# Patient Record
Sex: FEMALE | Race: WHITE | NOT HISPANIC OR LATINO | URBAN - METROPOLITAN AREA
[De-identification: names, ages, dates, MRNs, and addresses within clinical notes are randomized per-mention and may not be internally consistent; named-entity substitution may affect disease eponyms.]

---

## 2023-03-22 ENCOUNTER — INPATIENT (INPATIENT)
Facility: HOSPITAL | Age: 78
LOS: 0 days | Discharge: ANOTHER HOSPITAL | DRG: 190 | End: 2023-03-23
Attending: INTERNAL MEDICINE | Admitting: INTERNAL MEDICINE
Payer: MEDICARE

## 2023-03-22 VITALS
SYSTOLIC BLOOD PRESSURE: 95 MMHG | HEIGHT: 68 IN | OXYGEN SATURATION: 96 % | HEART RATE: 114 BPM | DIASTOLIC BLOOD PRESSURE: 62 MMHG | RESPIRATION RATE: 20 BRPM | TEMPERATURE: 98 F | WEIGHT: 139.99 LBS

## 2023-03-22 DIAGNOSIS — J44.1 CHRONIC OBSTRUCTIVE PULMONARY DISEASE WITH (ACUTE) EXACERBATION: ICD-10-CM

## 2023-03-22 DIAGNOSIS — I21.4 NON-ST ELEVATION (NSTEMI) MYOCARDIAL INFARCTION: ICD-10-CM

## 2023-03-22 DIAGNOSIS — Z96.649 PRESENCE OF UNSPECIFIED ARTIFICIAL HIP JOINT: Chronic | ICD-10-CM

## 2023-03-22 DIAGNOSIS — G62.9 POLYNEUROPATHY, UNSPECIFIED: ICD-10-CM

## 2023-03-22 DIAGNOSIS — F41.9 ANXIETY DISORDER, UNSPECIFIED: ICD-10-CM

## 2023-03-22 DIAGNOSIS — Z96.642 PRESENCE OF LEFT ARTIFICIAL HIP JOINT: Chronic | ICD-10-CM

## 2023-03-22 DIAGNOSIS — N28.9 DISORDER OF KIDNEY AND URETER, UNSPECIFIED: ICD-10-CM

## 2023-03-22 DIAGNOSIS — R06.2 WHEEZING: ICD-10-CM

## 2023-03-22 LAB
ALBUMIN SERPL ELPH-MCNC: 4.4 G/DL — SIGNIFICANT CHANGE UP (ref 3.3–5)
ALP SERPL-CCNC: 73 U/L — SIGNIFICANT CHANGE UP (ref 40–120)
ALT FLD-CCNC: 18 U/L — SIGNIFICANT CHANGE UP (ref 10–45)
ANION GAP SERPL CALC-SCNC: 13 MMOL/L — SIGNIFICANT CHANGE UP (ref 5–17)
APTT BLD: 27.6 SEC — SIGNIFICANT CHANGE UP (ref 27.5–35.5)
APTT BLD: 29.5 SEC — SIGNIFICANT CHANGE UP (ref 27.5–35.5)
AST SERPL-CCNC: 23 U/L — SIGNIFICANT CHANGE UP (ref 10–40)
BASE EXCESS BLDV CALC-SCNC: -0.6 MMOL/L — SIGNIFICANT CHANGE UP (ref -2–3)
BASE EXCESS BLDV CALC-SCNC: -1.9 MMOL/L — SIGNIFICANT CHANGE UP (ref -2–3)
BASOPHILS # BLD AUTO: 0.04 K/UL — SIGNIFICANT CHANGE UP (ref 0–0.2)
BASOPHILS NFR BLD AUTO: 0.4 % — SIGNIFICANT CHANGE UP (ref 0–2)
BILIRUB SERPL-MCNC: 0.8 MG/DL — SIGNIFICANT CHANGE UP (ref 0.2–1.2)
BLOOD GAS VENOUS - CREATININE: SIGNIFICANT CHANGE UP MG/DL (ref 0.5–1.3)
BUN SERPL-MCNC: 15 MG/DL — SIGNIFICANT CHANGE UP (ref 7–23)
CA-I SERPL-SCNC: 1.2 MMOL/L — SIGNIFICANT CHANGE UP (ref 1.15–1.33)
CALCIUM SERPL-MCNC: 9 MG/DL — SIGNIFICANT CHANGE UP (ref 8.4–10.5)
CHLORIDE BLDV-SCNC: 107 MMOL/L — SIGNIFICANT CHANGE UP (ref 96–108)
CHLORIDE SERPL-SCNC: 106 MMOL/L — SIGNIFICANT CHANGE UP (ref 96–108)
CO2 BLDV-SCNC: 23 MMOL/L — SIGNIFICANT CHANGE UP (ref 22–26)
CO2 BLDV-SCNC: 28 MMOL/L — HIGH (ref 22–26)
CO2 SERPL-SCNC: 22 MMOL/L — SIGNIFICANT CHANGE UP (ref 22–31)
CREAT SERPL-MCNC: 0.59 MG/DL — SIGNIFICANT CHANGE UP (ref 0.5–1.3)
EGFR: 93 ML/MIN/1.73M2 — SIGNIFICANT CHANGE UP
EOSINOPHIL # BLD AUTO: 0.18 K/UL — SIGNIFICANT CHANGE UP (ref 0–0.5)
EOSINOPHIL NFR BLD AUTO: 1.8 % — SIGNIFICANT CHANGE UP (ref 0–6)
GAS PNL BLDV: 137 MMOL/L — SIGNIFICANT CHANGE UP (ref 136–145)
GAS PNL BLDV: SIGNIFICANT CHANGE UP
GLUCOSE BLDV-MCNC: 93 MG/DL — SIGNIFICANT CHANGE UP (ref 70–99)
GLUCOSE SERPL-MCNC: 99 MG/DL — SIGNIFICANT CHANGE UP (ref 70–99)
HCO3 BLDV-SCNC: 22 MMOL/L — SIGNIFICANT CHANGE UP (ref 22–29)
HCO3 BLDV-SCNC: 27 MMOL/L — SIGNIFICANT CHANGE UP (ref 22–29)
HCT VFR BLD CALC: 47 % — HIGH (ref 34.5–45)
HCT VFR BLDA CALC: 47 % — HIGH (ref 34.5–46.5)
HGB BLD CALC-MCNC: 15.8 G/DL — SIGNIFICANT CHANGE UP (ref 11.7–16.1)
HGB BLD-MCNC: 15.1 G/DL — SIGNIFICANT CHANGE UP (ref 11.5–15.5)
IMM GRANULOCYTES NFR BLD AUTO: 0.4 % — SIGNIFICANT CHANGE UP (ref 0–0.9)
INR BLD: 1.03 RATIO — SIGNIFICANT CHANGE UP (ref 0.88–1.16)
INR BLD: 1.04 RATIO — SIGNIFICANT CHANGE UP (ref 0.88–1.16)
LACTATE BLDV-MCNC: 1.6 MMOL/L — SIGNIFICANT CHANGE UP (ref 0.5–2)
LYMPHOCYTES # BLD AUTO: 0.53 K/UL — LOW (ref 1–3.3)
LYMPHOCYTES # BLD AUTO: 5.4 % — LOW (ref 13–44)
MCHC RBC-ENTMCNC: 30.3 PG — SIGNIFICANT CHANGE UP (ref 27–34)
MCHC RBC-ENTMCNC: 32.1 GM/DL — SIGNIFICANT CHANGE UP (ref 32–36)
MCV RBC AUTO: 94.4 FL — SIGNIFICANT CHANGE UP (ref 80–100)
MONOCYTES # BLD AUTO: 0.62 K/UL — SIGNIFICANT CHANGE UP (ref 0–0.9)
MONOCYTES NFR BLD AUTO: 6.3 % — SIGNIFICANT CHANGE UP (ref 2–14)
NEUTROPHILS # BLD AUTO: 8.41 K/UL — HIGH (ref 1.8–7.4)
NEUTROPHILS NFR BLD AUTO: 85.7 % — HIGH (ref 43–77)
NRBC # BLD: 0 /100 WBCS — SIGNIFICANT CHANGE UP (ref 0–0)
PCO2 BLDV: 35 MMHG — LOW (ref 39–42)
PCO2 BLDV: 53 MMHG — HIGH (ref 39–42)
PH BLDV: 7.31 — LOW (ref 7.32–7.43)
PH BLDV: 7.41 — SIGNIFICANT CHANGE UP (ref 7.32–7.43)
PLATELET # BLD AUTO: 168 K/UL — SIGNIFICANT CHANGE UP (ref 150–400)
PO2 BLDV: 42 MMHG — SIGNIFICANT CHANGE UP (ref 25–45)
PO2 BLDV: 72 MMHG — HIGH (ref 25–45)
POTASSIUM BLDV-SCNC: 4.4 MMOL/L — SIGNIFICANT CHANGE UP (ref 3.5–5.1)
POTASSIUM SERPL-MCNC: 4.7 MMOL/L — SIGNIFICANT CHANGE UP (ref 3.5–5.3)
POTASSIUM SERPL-SCNC: 4.7 MMOL/L — SIGNIFICANT CHANGE UP (ref 3.5–5.3)
PROT SERPL-MCNC: 6.6 G/DL — SIGNIFICANT CHANGE UP (ref 6–8.3)
PROTHROM AB SERPL-ACNC: 11.8 SEC — SIGNIFICANT CHANGE UP (ref 10.5–13.4)
PROTHROM AB SERPL-ACNC: 12 SEC — SIGNIFICANT CHANGE UP (ref 10.5–13.4)
RAPID RVP RESULT: SIGNIFICANT CHANGE UP
RBC # BLD: 4.98 M/UL — SIGNIFICANT CHANGE UP (ref 3.8–5.2)
RBC # FLD: 13.2 % — SIGNIFICANT CHANGE UP (ref 10.3–14.5)
SAO2 % BLDV: 73.2 % — SIGNIFICANT CHANGE UP (ref 67–88)
SAO2 % BLDV: 97.2 % — HIGH (ref 67–88)
SARS-COV-2 RNA SPEC QL NAA+PROBE: SIGNIFICANT CHANGE UP
SODIUM SERPL-SCNC: 141 MMOL/L — SIGNIFICANT CHANGE UP (ref 135–145)
TROPONIN T, HIGH SENSITIVITY RESULT: 388 NG/L — HIGH (ref 0–51)
TROPONIN T, HIGH SENSITIVITY RESULT: 466 NG/L — HIGH (ref 0–51)
TROPONIN T, HIGH SENSITIVITY RESULT: 564 NG/L — HIGH (ref 0–51)
WBC # BLD: 9.82 K/UL — SIGNIFICANT CHANGE UP (ref 3.8–10.5)
WBC # FLD AUTO: 9.82 K/UL — SIGNIFICANT CHANGE UP (ref 3.8–10.5)

## 2023-03-22 PROCEDURE — 99285 EMERGENCY DEPT VISIT HI MDM: CPT | Mod: FS

## 2023-03-22 PROCEDURE — 99223 1ST HOSP IP/OBS HIGH 75: CPT

## 2023-03-22 PROCEDURE — 71045 X-RAY EXAM CHEST 1 VIEW: CPT | Mod: 26

## 2023-03-22 PROCEDURE — 70450 CT HEAD/BRAIN W/O DYE: CPT | Mod: 26,MA

## 2023-03-22 PROCEDURE — 71275 CT ANGIOGRAPHY CHEST: CPT | Mod: 26,MA

## 2023-03-22 RX ORDER — SODIUM CHLORIDE 9 MG/ML
500 INJECTION INTRAMUSCULAR; INTRAVENOUS; SUBCUTANEOUS ONCE
Refills: 0 | Status: COMPLETED | OUTPATIENT
Start: 2023-03-22 | End: 2023-03-22

## 2023-03-22 RX ORDER — EPINEPHRINE 0.3 MG/.3ML
0.5 INJECTION INTRAMUSCULAR; SUBCUTANEOUS ONCE
Refills: 0 | Status: COMPLETED | OUTPATIENT
Start: 2023-03-22 | End: 2023-03-22

## 2023-03-22 RX ORDER — IPRATROPIUM/ALBUTEROL SULFATE 18-103MCG
3 AEROSOL WITH ADAPTER (GRAM) INHALATION EVERY 6 HOURS
Refills: 0 | Status: DISCONTINUED | OUTPATIENT
Start: 2023-03-22 | End: 2023-03-23

## 2023-03-22 RX ORDER — IPRATROPIUM/ALBUTEROL SULFATE 18-103MCG
3 AEROSOL WITH ADAPTER (GRAM) INHALATION ONCE
Refills: 0 | Status: COMPLETED | OUTPATIENT
Start: 2023-03-22 | End: 2023-03-22

## 2023-03-22 RX ORDER — ASPIRIN/CALCIUM CARB/MAGNESIUM 324 MG
324 TABLET ORAL ONCE
Refills: 0 | Status: COMPLETED | OUTPATIENT
Start: 2023-03-22 | End: 2023-03-22

## 2023-03-22 RX ORDER — ACETAMINOPHEN 500 MG
650 TABLET ORAL EVERY 6 HOURS
Refills: 0 | Status: DISCONTINUED | OUTPATIENT
Start: 2023-03-22 | End: 2023-03-23

## 2023-03-22 RX ORDER — LANOLIN ALCOHOL/MO/W.PET/CERES
3 CREAM (GRAM) TOPICAL AT BEDTIME
Refills: 0 | Status: DISCONTINUED | OUTPATIENT
Start: 2023-03-22 | End: 2023-03-23

## 2023-03-22 RX ORDER — ASPIRIN/CALCIUM CARB/MAGNESIUM 324 MG
81 TABLET ORAL DAILY
Refills: 0 | Status: DISCONTINUED | OUTPATIENT
Start: 2023-03-22 | End: 2023-03-23

## 2023-03-22 RX ORDER — ESCITALOPRAM OXALATE 10 MG/1
10 TABLET, FILM COATED ORAL DAILY
Refills: 0 | Status: DISCONTINUED | OUTPATIENT
Start: 2023-03-22 | End: 2023-03-23

## 2023-03-22 RX ORDER — TIOTROPIUM BROMIDE 18 UG/1
2 CAPSULE ORAL; RESPIRATORY (INHALATION) DAILY
Refills: 0 | Status: DISCONTINUED | OUTPATIENT
Start: 2023-03-22 | End: 2023-03-23

## 2023-03-22 RX ORDER — HEPARIN SODIUM 5000 [USP'U]/ML
3800 INJECTION INTRAVENOUS; SUBCUTANEOUS ONCE
Refills: 0 | Status: COMPLETED | OUTPATIENT
Start: 2023-03-22 | End: 2023-03-23

## 2023-03-22 RX ORDER — HEPARIN SODIUM 5000 [USP'U]/ML
INJECTION INTRAVENOUS; SUBCUTANEOUS
Qty: 25000 | Refills: 0 | Status: DISCONTINUED | OUTPATIENT
Start: 2023-03-22 | End: 2023-03-23

## 2023-03-22 RX ORDER — MAGNESIUM SULFATE 500 MG/ML
2 VIAL (ML) INJECTION ONCE
Refills: 0 | Status: COMPLETED | OUTPATIENT
Start: 2023-03-22 | End: 2023-03-22

## 2023-03-22 RX ORDER — BUDESONIDE, MICRONIZED 100 %
0.5 POWDER (GRAM) MISCELLANEOUS EVERY 12 HOURS
Refills: 0 | Status: DISCONTINUED | OUTPATIENT
Start: 2023-03-22 | End: 2023-03-23

## 2023-03-22 RX ORDER — BUDESONIDE AND FORMOTEROL FUMARATE DIHYDRATE 160; 4.5 UG/1; UG/1
2 AEROSOL RESPIRATORY (INHALATION)
Refills: 0 | Status: DISCONTINUED | OUTPATIENT
Start: 2023-03-22 | End: 2023-03-22

## 2023-03-22 RX ORDER — ONDANSETRON 8 MG/1
4 TABLET, FILM COATED ORAL EVERY 8 HOURS
Refills: 0 | Status: DISCONTINUED | OUTPATIENT
Start: 2023-03-22 | End: 2023-03-23

## 2023-03-22 RX ORDER — MONTELUKAST 4 MG/1
10 TABLET, CHEWABLE ORAL DAILY
Refills: 0 | Status: DISCONTINUED | OUTPATIENT
Start: 2023-03-22 | End: 2023-03-23

## 2023-03-22 RX ORDER — MAGNESIUM SULFATE 500 MG/ML
2 VIAL (ML) INJECTION ONCE
Refills: 0 | Status: DISCONTINUED | OUTPATIENT
Start: 2023-03-22 | End: 2023-03-22

## 2023-03-22 RX ORDER — ATORVASTATIN CALCIUM 80 MG/1
40 TABLET, FILM COATED ORAL AT BEDTIME
Refills: 0 | Status: DISCONTINUED | OUTPATIENT
Start: 2023-03-22 | End: 2023-03-23

## 2023-03-22 RX ORDER — LORATADINE 10 MG/1
10 TABLET ORAL DAILY
Refills: 0 | Status: DISCONTINUED | OUTPATIENT
Start: 2023-03-22 | End: 2023-03-23

## 2023-03-22 RX ORDER — HEPARIN SODIUM 5000 [USP'U]/ML
3800 INJECTION INTRAVENOUS; SUBCUTANEOUS EVERY 6 HOURS
Refills: 0 | Status: DISCONTINUED | OUTPATIENT
Start: 2023-03-22 | End: 2023-03-23

## 2023-03-22 RX ORDER — GABAPENTIN 400 MG/1
300 CAPSULE ORAL AT BEDTIME
Refills: 0 | Status: DISCONTINUED | OUTPATIENT
Start: 2023-03-22 | End: 2023-03-23

## 2023-03-22 RX ORDER — ACETAMINOPHEN 500 MG
650 TABLET ORAL ONCE
Refills: 0 | Status: COMPLETED | OUTPATIENT
Start: 2023-03-22 | End: 2023-03-22

## 2023-03-22 RX ADMIN — Medication 3 MILLILITER(S): at 11:11

## 2023-03-22 RX ADMIN — Medication 2 GRAM(S): at 11:45

## 2023-03-22 RX ADMIN — SODIUM CHLORIDE 500 MILLILITER(S): 9 INJECTION INTRAMUSCULAR; INTRAVENOUS; SUBCUTANEOUS at 20:08

## 2023-03-22 RX ADMIN — GABAPENTIN 300 MILLIGRAM(S): 400 CAPSULE ORAL at 22:19

## 2023-03-22 RX ADMIN — Medication 150 GRAM(S): at 11:21

## 2023-03-22 RX ADMIN — Medication 3 MILLILITER(S): at 11:18

## 2023-03-22 RX ADMIN — SODIUM CHLORIDE 500 MILLILITER(S): 9 INJECTION INTRAMUSCULAR; INTRAVENOUS; SUBCUTANEOUS at 19:08

## 2023-03-22 RX ADMIN — EPINEPHRINE 0.5 MILLIGRAM(S): 0.3 INJECTION INTRAMUSCULAR; SUBCUTANEOUS at 14:08

## 2023-03-22 RX ADMIN — Medication 3 MILLILITER(S): at 11:27

## 2023-03-22 RX ADMIN — Medication 3 MILLILITER(S): at 14:07

## 2023-03-22 RX ADMIN — ATORVASTATIN CALCIUM 40 MILLIGRAM(S): 80 TABLET, FILM COATED ORAL at 22:19

## 2023-03-22 RX ADMIN — Medication 650 MILLIGRAM(S): at 20:13

## 2023-03-22 RX ADMIN — Medication 125 MILLIGRAM(S): at 11:11

## 2023-03-22 RX ADMIN — Medication 650 MILLIGRAM(S): at 17:35

## 2023-03-22 NOTE — ED PROVIDER NOTE - ATTENDING APP SHARED VISIT CONTRIBUTION OF CARE
I, Michelle Delaney, performed a history and physical exam of the patient and discussed their management with the advanced care provider. I reviewed the note and agree with the documented findings and plan of care. I was present and available for all procedures.    ---    Please see MDM

## 2023-03-22 NOTE — ED PROVIDER NOTE - NS ED ROS FT
Constitutional: No fever or chills  Eyes: No visual changes, eye pain or redness  HEENT: No throat pain, ear pain, nasal pain. No nose bleeding.  CV: +chest pain -lower extremity edema  Resp: + SOB +cough  GI: No abd pain. No nausea or vomiting. No diarrhea. No constipation.   : No dysuria, hematuria.   MSK: No musculoskeletal pain  Skin: No rash  Neuro: +headache. No numbness or tingling. No weakness.

## 2023-03-22 NOTE — ED PROVIDER NOTE - PROGRESS NOTE DETAILS
Chuck PGY2 - Received sign-out on patient. Introduced myself and updated patient on the medical evaluation process. Patient aware and in agreement. ENT came by to evaluate patient, patient cleared for po intake. Chuck, PGY2 - spoke with Dr. Ward, accepting patient for cardiac ischemia causing respiratory distress

## 2023-03-22 NOTE — ED ADULT NURSE REASSESSMENT NOTE - NS ED NURSE REASSESS COMMENT FT1
Family member becoming agitated regarding wait time. Family and patient reeducated about wait time and was updated about time frame for cat scan. Patient's needs assessed with provider, reassessed patient 3 times since beginning of shift at 1430. Spoke with provider about reeducating family on POC. No acute distress present at this time. Call bell within reach, bed in lowest position.

## 2023-03-22 NOTE — ED PROVIDER NOTE - CARE PLAN
1 Principal Discharge DX:	COPD with respiratory distress, acute  Secondary Diagnosis:	Cardiac ischemia

## 2023-03-22 NOTE — ED PROVIDER NOTE - CLINICAL SUMMARY MEDICAL DECISION MAKING FREE TEXT BOX
Michelle Delaney MD (Attending Physician): 77F with PMHx COPD, asthma presents for acute onset SOB with wheezing this morning. Pt reports left wrist s/p fall last night. No LOC, not on AC and did not hit head. Took meds at home with no relief. Received 2 duoneb PTA. On exam, tachypneic, No airway movement with expiratory wheezes diffusely. CVS: tachycardic. Differentials include but are not limited to: pneumothorax, COPD exacerbation, pneumonia, asthma exacerbation, pleural effusion, ACS, viral illness. Will administer duoneb,steroids, mag. Will obtain labs, CXR and reassess. Michelle Delaney MD (Attending Physician): 77F with PMHx COPD, asthma presents for acute onset SOB with wheezing this morning. Pt reports left wrist s/p fall last night. No LOC, not on AC and did not hit head. Took meds at home with no relief. Received 2 duoneb PTA. On exam, tachypneic, No airway movement with expiratory wheezes diffusely. CVS: tachycardic. Differentials include but are not limited to: pneumothorax, COPD exacerbation, pneumonia, asthma exacerbation, pleural effusion, ACS, viral illness. Will administer duoneb,steroids, mag. Will obtain labs, CXR and reassess.    Wheezes have resolved however still cannot hear great air movement. troponin elevated in the 300s,r patient ecg unremarkable. discussed CT Angio for further eval - r/o PE. Pt full code per daughter. Attempted epinephrine with no success. Pt still mentating well, O2 sat % on NC. Cards consulted and patient to be admitted for further management/optimization of COPD exacerbation.

## 2023-03-22 NOTE — H&P ADULT - NSHPLABSRESULTS_GEN_ALL_CORE
Labs personally reviewed:                          15.1   9.82  )-----------( 168      ( 22 Mar 2023 11:05 )             47.0     03-22    141  |  106  |  15  ----------------------------<  99  4.7   |  22  |  0.59    Ca    9.0      22 Mar 2023 11:05    TPro  6.6  /  Alb  4.4  /  TBili  0.8  /  DBili  x   /  AST  23  /  ALT  18  /  AlkPhos  73  03-22    CARDIAC MARKERS ( 22 Mar 2023 14:24 )  x     / x     / 89 U/L / x     / 10.4 ng/mL      LIVER FUNCTIONS - ( 22 Mar 2023 11:05 )  Alb: 4.4 g/dL / Pro: 6.6 g/dL / ALK PHOS: 73 U/L / ALT: 18 U/L / AST: 23 U/L / GGT: x           PT/INR - ( 22 Mar 2023 15:26 )   PT: 11.8 sec;   INR: 1.03 ratio         PTT - ( 22 Mar 2023 15:26 )  PTT:27.6 sec    CAPILLARY BLOOD GLUCOSE          Imaging:  CXR personally reviewed: no focal opacity  CTA chest: No pulmonary embolism.    1.3 cm ill-defined groundglass nodular opacity within the right upper   lobe is nonspecific. Differential diagnostic considerations include small   focus of infection/inflammation, focus of scarring or primary lung   neoplasm. 3 month follow-up noncontrast chest CT is recommended for   further evaluation.    Emphysema.    8mm indeterminate exophytic nodule arising from the partially imaged left   kidney. A 3 month follow-up contrast-enhanced liver MRI can be performed   for further evaluation.    CTH : No acute intracranial bleeding, mass effect, or shift. If the patient has   new and persistent symptoms, consider short interval follow-up head CT or   brain MRI follow-up.      EKG personally reviewed:  sinus tachycardia at 106 bpm , q wave v2 ,  twave flat avl, no NEYMAR or depression

## 2023-03-22 NOTE — ED ADULT NURSE REASSESSMENT NOTE - NS ED NURSE REASSESS COMMENT FT1
Report taken from [facility]. Patient sitting up in bed. Family is present at bedside. Patient and family updated on POC. Patient given wet gauze to wet mouth for comfort. No acute distress present at this time. Call bell within reach, bed in lowest position.

## 2023-03-22 NOTE — H&P ADULT - NSHPPHYSICALEXAM_GEN_ALL_CORE
Vital Signs Last 24 Hrs  T(C): 36.7 (22 Mar 2023 18:44), Max: 36.9 (22 Mar 2023 10:38)  T(F): 98 (22 Mar 2023 18:44), Max: 98.5 (22 Mar 2023 14:00)  HR: 116 (22 Mar 2023 18:44) (101 - 120)  BP: 114/55 (22 Mar 2023 18:44) (94/66 - 114/55)  BP(mean): 71 (22 Mar 2023 18:44) (65 - 71)  RR: 20 (22 Mar 2023 18:44) (20 - 22)  SpO2: 99% (22 Mar 2023 18:44) (96% - 99%)    Parameters below as of 22 Mar 2023 18:44  Patient On (Oxygen Delivery Method): nasal cannula  O2 Flow (L/min): 3 Vital Signs Last 24 Hrs  T(C): 36.7 (22 Mar 2023 18:44), Max: 36.9 (22 Mar 2023 10:38)  T(F): 98 (22 Mar 2023 18:44), Max: 98.5 (22 Mar 2023 14:00)  HR: 116 (22 Mar 2023 18:44) (101 - 120)  BP: 114/55 (22 Mar 2023 18:44) (94/66 - 114/55)  BP(mean): 71 (22 Mar 2023 18:44) (65 - 71)  RR: 20 (22 Mar 2023 18:44) (20 - 22)  SpO2: 99% (22 Mar 2023 18:44) (96% - 99%)    Parameters below as of 22 Mar 2023 18:44  Patient On (Oxygen Delivery Method): nasal cannula    GENERAL: moderate respiratory  distress, well-developed, able to complete full sentences no retractions or nasal flaring   HEAD:  Atraumatic, Normocephalic  ENT: EOMI, PERRLA, conjunctiva and sclera clear,  moist mucosa no pharyngeal erythema or exudates   NECK: supple , no JVD   CHEST/LUNG: decreased air movement bilaterally; No audible  wheeze, equal breath sounds bilaterally   BACK: No spinal tenderness,  No CVA tenderness   HEART: Regular rate and rhythm; No murmurs, rubs, or gallops  ABDOMEN: Soft, Nontender, Nondistended; Bowel sounds present  EXTREMITIES:  No clubbing, cyanosis, trace  edema on LLE   MSK: No joint swelling or effusions, ROM intact   PSYCH: Normal behavior/affect  NEUROLOGY: AAOx3, non-focal, cranial nerves intact  SKIN: Normal color, No rashes or lesions  O2 Flow (L/min): 3

## 2023-03-22 NOTE — H&P ADULT - PROBLEM SELECTOR PLAN 5
8mm indeterminate exophytic nodule arising from the partially imaged left   kidney.  findings on CT disclosed to daughter , recommend 3 month follow up

## 2023-03-22 NOTE — H&P ADULT - NSICDXFAMILYHX_GEN_ALL_CORE_FT
FAMILY HISTORY:  Father  Still living? Unknown  FH: myocardial infarction, Age at diagnosis: Age Unknown    Mother  Still living? Unknown  FH: rheumatic heart disease, Age at diagnosis: Age Unknown

## 2023-03-22 NOTE — H&P ADULT - PROBLEM SELECTOR PLAN 1
up trending cardiac enzymes , ekg w/ no acute s-t changes , patient c/o active chest pain s/p ASA load , will start heparin for ACS protocol; discussed with cardiology, recent cath w/ clean coronaries makes CAD less likely , mores likely type II NStemi in setting of COPD exacerbation   - Telemetry   - trend troponin until peaks   - Heparin infusion ACS protocol   - continue asa   - a1c/ lipid profile   - atorvastatin  - echocardiogram   - follow up cardiology consult

## 2023-03-22 NOTE — ED PROVIDER NOTE - OBJECTIVE STATEMENT
77yof pmhx of COPD BIB EMS for wheezing and sob. daughter at bedside, reports pt sustained a fall last night ?landed on her knees or L side and then this am with cough, wheezing, miguel and sob. reports taking neb and rescue inhaler but didn't help. No recent URI symx. No fever or chills. no leg pain or swelling. No sick contacts. last admission for COPD 2016

## 2023-03-22 NOTE — ED ADULT NURSE REASSESSMENT NOTE - NS ED NURSE REASSESS COMMENT FT1
Patient reports Tylenol per PA order provided relief of headache. Patient asked about bathroom use. Nurse educated patient on use of call bell and capability to bring patient with assist to bathroom with wheelchair. Patient on continuos cardiac monitoring. No acute distress present at this time. Call bell within reach, bed in lowest position.

## 2023-03-22 NOTE — CONSULT NOTE ADULT - SUBJECTIVE AND OBJECTIVE BOX
CC: Upper Airway Evaluation.    HPI: 76 YO F with PMH of COPD presents to [facility] ED for wheezing and and SOB. ENT was consulted for evaluation. Per daughter at bedside, pt noted to have cough, wheezing, and SOB this morning. Reports taking Neb and rescue Inhaler but didnt help. Pt denies hoarseness, muffled voice, drooling, able to handle secretions without difficulty.     PAST MEDICAL & SURGICAL HISTORY:    Allergies  penicillin (Unknown)    Intolerances  MEDICATIONS  (STANDING):  MEDICATIONS  (PRN):    Social History:   Family history:     ROS:   ENT: all negative except as noted in HPI   CV: denies palpitations  Pulm: denies SOB, cough, hemoptysis  GI: denies change in apetite, indigestion, n/v  : denies pertinent urinary symptoms, urgency  Neuro: denies numbness/tingling, loss of sensation  Psych: denies anxiety  MS: denies muscle weakness, instability  Heme: denies easy bruising or bleeding  Endo: denies heat/cold intolerance, excessive sweating  Vascular: denies LE edema    Vital Signs Last 24 Hrs  T(C): 36.7 (22 Mar 2023 18:44), Max: 36.9 (22 Mar 2023 10:38)  T(F): 98 (22 Mar 2023 18:44), Max: 98.5 (22 Mar 2023 14:00)  HR: 116 (22 Mar 2023 18:44) (101 - 120)  BP: 114/55 (22 Mar 2023 18:44) (94/66 - 114/55)  BP(mean): 71 (22 Mar 2023 18:44) (65 - 71)  RR: 20 (22 Mar 2023 18:44) (20 - 22)  SpO2: 99% (22 Mar 2023 18:44) (96% - 99%)  Parameters below as of 22 Mar 2023 18:44  Patient On (Oxygen Delivery Method): nasal cannula  O2 Flow (L/min): 3                        15.1   9.82  )-----------( 168      ( 22 Mar 2023 11:05 )             47.0    03-22    141  |  106  |  15  ----------------------------<  99  4.7   |  22  |  0.59    Ca    9.0      22 Mar 2023 11:05  TPro  6.6  /  Alb  4.4  /  TBili  0.8  /  DBili  x   /  AST  23  /  ALT  18  /  AlkPhos  73  03-22   PT/INR - ( 22 Mar 2023 15:26 )   PT: 11.8 sec;   INR: 1.03 ratio   PTT - ( 22 Mar 2023 15:26 )  PTT:27.6 sec    PHYSICAL EXAM:  Gen: NAD, On 2L NC.   Skin: No rashes, bruises, or lesions  Head: Normocephalic, Atraumatic  Face: no edema, erythema, or fluctuance. Parotid glands soft without mass  Eyes: no scleral injection  Nose: Nares bilaterally patent, no discharge  Mouth: No Stridor / Drooling / Trismus.  Mucosa moist, tongue/uvula midline, oropharynx clear  Neck: Flat, supple, no lymphadenopathy, trachea midline, no masses  Lymphatic: No lymphadenopathy  Resp: breathing easily, no stridor  CV: no peripheral edema/cyanosis  GI: nondistended   Peripheral vascular: no JVD or edema  Neuro: facial nerve intact, no facial droop    Fiberoptic Indirect laryngoscopy:  (Scope #2 used)  Reason for Laryngoscopy: Upper Airway Evaluation.     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  Vocal cords mobile with good contact b/l.    Nasopharynx, oropharynx, and hypopharynx clear, no bleeding. Tongue base, posterior pharyngeal wall, vallecula, epiglottis, and subglottis appear normal. No erythema, edema, pooling of secretions, masses or lesions. Airway patent, no foreign body visualized. No glottic/supraglottic edema. True vocal cords, arytenoids, vestibular folds, ventricles, pyriform sinuses, and aryepiglottic folds appear normal bilaterally.    IMAGING/ADDITIONAL STUDIES:   CT Head: IMPRESSION: No acute intracranial bleeding, mass effect, or shift. If the patient has   new and persistent symptoms, consider short interval follow-up head CT or   brain MRI follow-up.    CTA of Chest: IMPRESSION:  No pulmonary embolism.    1.3 cm ill-defined groundglass nodular opacity within the right upper   lobe is nonspecific. Differential diagnostic considerations include small   focus of infection/inflammation, focus of scarring or primary lung   neoplasm. 3 month follow-up noncontrast chest CT is recommended for   further evaluation.    Emphysema.    8mm indeterminate exophytic nodule arising from the partially imaged left   kidney. A 3 month follow-up contrast-enhanced liver MRI can be performed   for further evaluation.  CC: Upper Airway Evaluation.    HPI: 76 YO F with PMH of COPD, Asthma  presents to [facility] ED for wheezing and and SOB x1 day. . ENT was consulted for evaluation. Per daughter at bedside, pt noted to have cough, wheezing, and SOB this morning. Reports taking Neb and rescue Inhaler but didnt help. Pt denies throat closing sensation, Foreign body sensation,  hoarseness, muffled voice, drooling, able to handle secretions without difficulty. Also reports  a fall from bed sustaining trauma to her knees , no head trauma no reported LOC  last night. Also denies fever/chills, runny nose, sinus pain, sore throat, N/V, CP, HA, Dysphagia/Odynophagia, recent travel/sick contacts.     PAST MEDICAL & SURGICAL HISTORY:    Allergies  penicillin (Unknown)    Intolerances  MEDICATIONS  (STANDING):  MEDICATIONS  (PRN):    Social History:   Family history:     ROS:   ENT: all negative except as noted in HPI   CV: denies palpitations  Pulm: denies SOB, cough, hemoptysis  GI: denies change in apetite, indigestion, n/v  : denies pertinent urinary symptoms, urgency  Neuro: denies numbness/tingling, loss of sensation  Psych: denies anxiety  MS: denies muscle weakness, instability  Heme: denies easy bruising or bleeding  Endo: denies heat/cold intolerance, excessive sweating  Vascular: denies LE edema    Vital Signs Last 24 Hrs  T(C): 36.7 (22 Mar 2023 18:44), Max: 36.9 (22 Mar 2023 10:38)  T(F): 98 (22 Mar 2023 18:44), Max: 98.5 (22 Mar 2023 14:00)  HR: 116 (22 Mar 2023 18:44) (101 - 120)  BP: 114/55 (22 Mar 2023 18:44) (94/66 - 114/55)  BP(mean): 71 (22 Mar 2023 18:44) (65 - 71)  RR: 20 (22 Mar 2023 18:44) (20 - 22)  SpO2: 99% (22 Mar 2023 18:44) (96% - 99%)  Parameters below as of 22 Mar 2023 18:44  Patient On (Oxygen Delivery Method): nasal cannula  O2 Flow (L/min): 3                        15.1   9.82  )-----------( 168      ( 22 Mar 2023 11:05 )             47.0    03-22    141  |  106  |  15  ----------------------------<  99  4.7   |  22  |  0.59    Ca    9.0      22 Mar 2023 11:05  TPro  6.6  /  Alb  4.4  /  TBili  0.8  /  DBili  x   /  AST  23  /  ALT  18  /  AlkPhos  73  03-22   PT/INR - ( 22 Mar 2023 15:26 )   PT: 11.8 sec;   INR: 1.03 ratio   PTT - ( 22 Mar 2023 15:26 )  PTT:27.6 sec    PHYSICAL EXAM:  Gen: NAD, On 2L NC.   Skin: No rashes, bruises, or lesions  Head: Normocephalic, Atraumatic  Face: no edema, erythema, or fluctuance. Parotid glands soft without mass  Eyes: no scleral injection  Nose: Nares bilaterally patent, no discharge  Mouth: No Stridor / Drooling / Trismus.  Mucosa moist, tongue/uvula midline, oropharynx clear  Neck: Flat, supple, no lymphadenopathy, trachea midline, no masses  Lymphatic: No lymphadenopathy  Resp: breathing easily, no stridor  CV: no peripheral edema/cyanosis  GI: nondistended   Peripheral vascular: no JVD or edema  Neuro: facial nerve intact, no facial droop    Fiberoptic Indirect laryngoscopy:  (Scope #2 used)  Reason for Laryngoscopy: Upper Airway Evaluation.     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  Vocal cords mobile with good contact b/l. Diffuse dryness in larynx.     Nasopharynx, oropharynx, and hypopharynx clear, no bleeding. Tongue base, posterior pharyngeal wall, vallecula, epiglottis, and subglottis appear normal. No erythema, edema, pooling of secretions, masses or lesions. Airway patent, no foreign body visualized. No glottic/supraglottic edema. True vocal cords, arytenoids, vestibular folds, ventricles, pyriform sinuses, and aryepiglottic folds appear normal bilaterally.    IMAGING/ADDITIONAL STUDIES:   CT Head: IMPRESSION: No acute intracranial bleeding, mass effect, or shift. If the patient has   new and persistent symptoms, consider short interval follow-up head CT or   brain MRI follow-up.    CTA of Chest: IMPRESSION:  No pulmonary embolism.    1.3 cm ill-defined groundglass nodular opacity within the right upper   lobe is nonspecific. Differential diagnostic considerations include small   focus of infection/inflammation, focus of scarring or primary lung   neoplasm. 3 month follow-up noncontrast chest CT is recommended for   further evaluation.    Emphysema.    8mm indeterminate exophytic nodule arising from the partially imaged left   kidney. A 3 month follow-up contrast-enhanced liver MRI can be performed   for further evaluation.

## 2023-03-22 NOTE — H&P ADULT - HISTORY OF PRESENT ILLNESS
Patient is a 77 year old female w/ pmhx of COPD ,  present for wheezing and shortness of breath for the past day.   Patient is a 77 year old female w/ pmhx of COPD ,  present for wheezing and shortness of breath for the past day.  Patient reports wheezing and shortness of breath with non productive cough , started on morning of admission , no improvement with albuterol or nebulizers treatment. She also reports right sided chest pain at the time , not worse with exertion or deep breath , but exacerbated with coughing , No fever or chills , no  sick contacts , no recent travel.   Per family , patient had a fall from bed sustaining trauma to her knees , no head trauma no reported LOC , no preceding symptoms.   Patient had cardiac cath at [facility] in May ’22 which reportedly showed no significant abnormalities.

## 2023-03-22 NOTE — ED ADULT NURSE NOTE - NSSEPSISSUSPECTED_ED_A_ED
Surgery Date: 3/28/2017      Surgeon(s) and Role:  * Vinicio Gray Jr., MD - Primary  * Karolina Treadwell MD - Resident - Assisting           Pre-op Diagnosis: DUB (dysfunctional uterine bleeding) [N93.8]     Post-op Diagnosis: Post-Op Diagnosis Codes:  * DUB (dysfunctional uterine bleeding) [N93.8]     Procedure(s) (LRB):  ROBOTIC ASSISTED LAPAROSCOPIC HYSTERECTOMY (N/A)  CYSTOSCOPY     Anesthesia: General     Findings/Key Components:   1) Uterus that sounded to 9.5cm  2) Normal appearing cervix without any masses or lesions  3) Omental adhesions to anterior abdominal wall, partially taken down  4) Liver with adhesions consistent with [id number]  5) Normal bowel throughout. No signs of injury  6) Normal appearing bilateral fallopian tubes and ovaries. Evidence of previous BTL.  7) Bilateral ureters noted without signs of injury  8) On cystoscopy, efflux of bilateral ureteral orifices noted.   9) Hemostasis throughout pelvis  10) On fluoroscopy, no evidence of any needles in abdomen. Needle found lodged in Left trocar. Removed intact     Estimated Blood Loss: 100 mL       Yes

## 2023-03-22 NOTE — H&P ADULT - NSHPREVIEWOFSYSTEMS_GEN_ALL_CORE
CONSTITUTIONAL: No weakness, fevers or chills  EYES/ENT: No visual changes;  No dysphagia  NECK: No pain or stiffness  RESPIRATORY: +  cough,  + wheezing, no hemoptysis; +  shortness of breath  CARDIOVASCULAR: +  chest pain no  palpitations; No lower extremity edema  EXTREMITIES: no le edema, cyanosis, clubbing  GASTROINTESTINAL: No abdominal or epigastric pain. + nausea, vomiting, or hematemesis; No diarrhea or constipation. No melena or hematochezia.  BACK: +  back pain  GENITOURINARY: No dysuria, frequency or hematuria  NEUROLOGICAL: No numbness or weakness  MSK: no joint swelling or pain  SKIN: No itching, burning, rashes, or lesions   PSYCH: no agitation  All other review of systems is negative unless indicated above.

## 2023-03-22 NOTE — ED ADULT NURSE NOTE - OBJECTIVE STATEMENT
1030 77 yr old WF brought to ER via ambulance on stretcher for further eval and tx of SOB and dyspnea x a few hours upon awakening this AM. Was given 2 Duo nebs by EMS enroute to ER. Also tripped and fell yesterday landing on knees. No LOC. A&Ox4. Color pink. Skin W&D. Decreased breath sounds. Scattered wheezes. O2 100% NRB changed to NC 3LPM. PMH COPD. Quit smoking 2017. Fall risk precautions maintained. Hx through pt's daughter. Dr hargrove pt. Cardiac monitor applied

## 2023-03-22 NOTE — CONSULT NOTE ADULT - SUBJECTIVE AND OBJECTIVE BOX
DATE OF SERVICE: 03-22-23 @ 23:37    CHIEF COMPLAINT:Patient is a 77y old  Female who presents with a chief complaint of Wheezing and SOB x 1 day (22 Mar 2023 20:51)      HISTORY OF PRESENT ILLNESS:HPI:  Patient is a 77 year old female w/ pmhx of COPD ,  present for wheezing and shortness of breath for the past day.  Patient reports wheezing and shortness of breath with non productive cough , started on morning of admission , no improvement with albuterol or nebulizers treatment. She also reports right sided chest pain at the time , not worse with exertion or deep breath , but exacerbated with coughing , No fever or chills , no  sick contacts , no recent travel.   Per family , patient had a fall from bed sustaining trauma to her knees , no head trauma no reported LOC , no preceding symptoms.   Patient had cardiac cath at [facility] in May ’22 which reportedly showed no significant abnormalities.      (22 Mar 2023 20:51)      PAST MEDICAL & SURGICAL HISTORY:  COPD exacerbation      H/O rheumatoid arthritis      S/P hip replacement              MEDICATIONS:  aspirin enteric coated 81 milliGRAM(s) Oral daily  heparin   Injectable 3800 Unit(s) IV Push once  heparin   Injectable 3800 Unit(s) IV Push every 6 hours PRN  heparin  Infusion.  Unit(s)/Hr IV Continuous <Continuous>      albuterol/ipratropium for Nebulization 3 milliLiter(s) Nebulizer every 6 hours  buDESOnide    Inhalation Suspension 0.5 milliGRAM(s) Inhalation every 12 hours  loratadine 10 milliGRAM(s) Oral daily  montelukast 10 milliGRAM(s) Oral daily  tiotropium 2.5 MICROgram(s) Inhaler 2 Puff(s) Inhalation daily    acetaminophen     Tablet .. 650 milliGRAM(s) Oral every 6 hours PRN  escitalopram 10 milliGRAM(s) Oral daily  gabapentin 300 milliGRAM(s) Oral at bedtime  melatonin 3 milliGRAM(s) Oral at bedtime PRN  ondansetron Injectable 4 milliGRAM(s) IV Push every 8 hours PRN    aluminum hydroxide/magnesium hydroxide/simethicone Suspension 30 milliLiter(s) Oral every 4 hours PRN    atorvastatin 40 milliGRAM(s) Oral at bedtime        FAMILY HISTORY:  FH: myocardial infarction (Father)    FH: rheumatic heart disease (Mother)        Non-contributory    SOCIAL HISTORY:    [ ] former smoker    Allergies    penicillin (Unknown)    Intolerances    	    REVIEW OF SYSTEMS:  CONSTITUTIONAL: No fever  EYES: No eye pain, visual disturbances, or discharge  ENMT:  No difficulty hearing, tinnitus  NECK: No pain or stiffness  RESPIRATORY: + cough, wheezing, SOB  CARDIOVASCULAR: No chest pain, palpitations, passing out, dizziness, or leg swelling  GASTROINTESTINAL:  No nausea, vomiting, diarrhea or constipation. No melena.  GENITOURINARY: No dysuria, hematuria  NEUROLOGICAL: No stroke like symptoms  SKIN: No burning or lesions   ENDOCRINE: No heat or cold intolerance  MUSCULOSKELETAL: No joint pain or swelling  PSYCHIATRIC: No  anxiety, mood swings  HEME/LYMPH: No bleeding gums  ALLERGY AND IMMUNOLOGIC: No hives or eczema	    All other ROS negative    PHYSICAL EXAM:  T(C): 37 (03-22-23 @ 21:40), Max: 37 (03-22-23 @ 21:40)  HR: 102 (03-22-23 @ 22:25) (100 - 120)  BP: 107/64 (03-22-23 @ 22:25) (94/66 - 114/55)  RR: 18 (03-22-23 @ 21:40) (18 - 22)  SpO2: 97% (03-22-23 @ 21:40) (96% - 99%)  Wt(kg): --  I&O's Summary      Appearance: Normal	  HEENT:   Normal oral mucosa, EOMI	  Cardiovascular:  S1 S2, No JVD,    Respiratory: Lungs clear to auscultation	  Psychiatry: Alert  Gastrointestinal:  Soft, Non-tender, + BS	  Skin: No rashes   Neurologic: Non-focal  Extremities:  No edema  Vascular: Peripheral pulses palpable    	    	  	  CARDIAC MARKERS:  Labs personally reviewed by me                                  15.1   9.82  )-----------( 168      ( 22 Mar 2023 11:05 )             47.0     03-22    141  |  106  |  15  ----------------------------<  99  4.7   |  22  |  0.59    Ca    9.0      22 Mar 2023 11:05    TPro  6.6  /  Alb  4.4  /  TBili  0.8  /  DBili  x   /  AST  23  /  ALT  18  /  AlkPhos  73  03-22          EKG: Personally reviewed by me - Sinus tach, no ischemic changes  Radiology: Personally reviewed by me -   < from: CT Angio Chest PE Protocol w/ IV Cont (03.22.23 @ 19:09) >  IMPRESSION:  No pulmonary embolism.    1.3 cm ill-defined groundglass nodular opacity within the right upper   lobe is nonspecific. Differential diagnostic considerations include small   focus of infection/inflammation, focus of scarring or primary lung   neoplasm. 3 month follow-up noncontrast chest CT is recommended for   further evaluation.    Emphysema.    8mm indeterminate exophytic nodule arising from the partially imaged left   kidney. A 3 month follow-up contrast-enhanced liver MRI can be performed   for further evaluation.    < end of copied text >          Labs, Radiology, Cardiology, and Other Results: Labs personally reviewed:                          15.1   9.82  )-----------( 168      ( 22 Mar 2023 11:05 )             47.0     03-22    141  |  106  |  15  ----------------------------<  99  4.7   |  22  |  0.59    Ca    9.0      22 Mar 2023 11:05    TPro  6.6  /  Alb  4.4  /  TBili  0.8  /  DBili  x   /  AST  23  /  ALT  18  /  AlkPhos  73  03-22    CARDIAC MARKERS ( 22 Mar 2023 14:24 )  x     / x     / 89 U/L / x     / 10.4 ng/mL      LIVER FUNCTIONS - ( 22 Mar 2023 11:05 )  Alb: 4.4 g/dL / Pro: 6.6 g/dL / ALK PHOS: 73 U/L / ALT: 18 U/L / AST: 23 U/L / GGT: x           PT/INR - ( 22 Mar 2023 15:26 )   PT: 11.8 sec;   INR: 1.03 ratio         PTT - ( 22 Mar 2023 15:26 )  PTT:27.6 sec    CAPILLARY BLOOD GLUCOSE          Imaging:  CXR personally reviewed: no focal opacity  CTA chest: No pulmonary embolism.    1.3 cm ill-defined groundglass nodular opacity within the right upper   lobe is nonspecific. Differential diagnostic considerations include small   focus of infection/inflammation, focus of scarring or primary lung   neoplasm. 3 month follow-up noncontrast chest CT is recommended for   further evaluation.    Emphysema.    8mm indeterminate exophytic nodule arising from the partially imaged left   kidney. A 3 month follow-up contrast-enhanced liver MRI can be performed   for further evaluation.    CTH : No acute intracranial bleeding, mass effect, or shift. If the patient has   new and persistent symptoms, consider short interval follow-up head CT or   brain MRI follow-up.      EKG personally reviewed:  sinus tachycardia at 106 bpm , q wave v2 ,  twave flat avl, no NEYMAR or depression    Assessment and Plan:   · VTE Risk Assessment	VTE Assessment already completed for this visit    Assessment:  · Assessment	  77Fw/ pmhx of COPD ,  p/w SOB and Chest pain x 1 day       Problem/Plan - 1:  ·  Problem: NSTEMI (non-ST elevation myocardial infarction).   ·  Plan: up trending cardiac enzymes , ekg w/ no acute s-t changes , patient DENIES any chest pain at any point,  s/p ASA load   - given rising trop will start Hep gtt for now  -  recent cath 5/9/2022 at [facility] reviewed w/ clean coronaries and only 25% RCA stenosis, this makes primary NSTEMI less likely  - more likely type II MI in setting of COPD exacerbation and Hypoxia  - Telemetry   - trend troponin until peaks   - continue asa   - a1c/ lipid profile   - atorvastatin  - echocardiogram     Problem/Plan - 2:  ·  Problem: COPD exacerbation.   ·  Plan: does not meet gold criteria for antibiotics   - duoneb q6h   - continue Solumedrol   - Budesonide   - tiotropium   - Singulair   - pulmonary consult Dr Atkinson called   - continue oxygen as needed.    Problem/Plan - 3:  ·  Problem: Anxiety.   ·  Plan: - Lexapro.    Problem/Plan - 4:  ·  Problem: Neuropathy.   ·  Plan: -gabapentin.    Problem/Plan - 5:  ·  Problem: Incidentals  ·  Plan: 8mm indeterminate exophytic nodule arising from the partially imaged left kidney. And Lung nodule   Findings on CT disclosed to daughter , recommend 3 month follow up.            Differential diagnosis and plan of care discussed with patient after the evaluation. Counseling on diet, nutritional counseling, weight management, exercise and medication compliance was done.   Advanced care planning/advanced directives discussed with patient/family. DNR status including forceful chest compressions to attempt to restart the heart, ventilator support/artificial breathing, electric shock, artificial nutrition, health care proxy, Molst form all discussed with pt. Pt wishes to consider. More than fifteen minutes spent on discussing advanced directives.            Sridhar Ward DO [facility]  Cardiovascular Medicine  [address] [place] [address], [address]  Office [phone]  Available via call/text on [employer] Teams

## 2023-03-22 NOTE — ED PROVIDER NOTE - PHYSICAL EXAMINATION
Gen: AAO x 3, talking in 3-4 word sentences with mild retractions.   Skin: No rashes or lesions  HEENT: NC/AT, PERRLA, EOMI, MMM  Resp: decrease air entry throughout, mild inspiratory and exp wheezing RUL   Cardiac: tachy s1s2, no murmurs, rubs or gallops  GI: ND, +BS, Soft, NT  Ext: no pedal edema, FROM in all extremities  Neuro: no focal deficits

## 2023-03-22 NOTE — H&P ADULT - PROBLEM SELECTOR PLAN 2
does not meet gold criteria for antibiotics   - duoneb q6h   - continue Solumedrol   - Budesonide   - tiotropium   - Singulair   - pulmonary consult - to be arranged by day team  - continue oxygen as needed

## 2023-03-22 NOTE — CONSULT NOTE ADULT - PROBLEM SELECTOR RECOMMENDATION 9
- FOE: Post cricoid edema, pachydermia 2/2 LPRD.  Vocal cords mobile with good contact b/l. Diffuse dryness in larynx.  - Humidified O2 via Face tent.   - NS 0.65% 2gtts TID to b/l nares.   - PPI.   - Duo Nebs prn.   - Call with questions.     # [phone]  ENT PA

## 2023-03-23 VITALS
HEART RATE: 101 BPM | OXYGEN SATURATION: 92 % | RESPIRATION RATE: 18 BRPM | SYSTOLIC BLOOD PRESSURE: 118 MMHG | DIASTOLIC BLOOD PRESSURE: 77 MMHG | TEMPERATURE: 98 F

## 2023-03-23 LAB
A1C WITH ESTIMATED AVERAGE GLUCOSE RESULT: 5.2 % — SIGNIFICANT CHANGE UP (ref 4–5.6)
ALBUMIN SERPL ELPH-MCNC: 4.2 G/DL — SIGNIFICANT CHANGE UP (ref 3.3–5)
ALP SERPL-CCNC: 70 U/L — SIGNIFICANT CHANGE UP (ref 40–120)
ALT FLD-CCNC: 16 U/L — SIGNIFICANT CHANGE UP (ref 10–45)
ANION GAP SERPL CALC-SCNC: 11 MMOL/L — SIGNIFICANT CHANGE UP (ref 5–17)
APTT BLD: 51.5 SEC — HIGH (ref 27.5–35.5)
AST SERPL-CCNC: 24 U/L — SIGNIFICANT CHANGE UP (ref 10–40)
BASOPHILS # BLD AUTO: 0.01 K/UL — SIGNIFICANT CHANGE UP (ref 0–0.2)
BASOPHILS NFR BLD AUTO: 0.1 % — SIGNIFICANT CHANGE UP (ref 0–2)
BILIRUB SERPL-MCNC: 0.9 MG/DL — SIGNIFICANT CHANGE UP (ref 0.2–1.2)
BUN SERPL-MCNC: 15 MG/DL — SIGNIFICANT CHANGE UP (ref 7–23)
CALCIUM SERPL-MCNC: 9.4 MG/DL — SIGNIFICANT CHANGE UP (ref 8.4–10.5)
CHLORIDE SERPL-SCNC: 105 MMOL/L — SIGNIFICANT CHANGE UP (ref 96–108)
CHOLEST SERPL-MCNC: 201 MG/DL — HIGH
CO2 SERPL-SCNC: 22 MMOL/L — SIGNIFICANT CHANGE UP (ref 22–31)
CREAT SERPL-MCNC: 0.62 MG/DL — SIGNIFICANT CHANGE UP (ref 0.5–1.3)
EGFR: 92 ML/MIN/1.73M2 — SIGNIFICANT CHANGE UP
EOSINOPHIL # BLD AUTO: 0 K/UL — SIGNIFICANT CHANGE UP (ref 0–0.5)
EOSINOPHIL NFR BLD AUTO: 0 % — SIGNIFICANT CHANGE UP (ref 0–6)
ESTIMATED AVERAGE GLUCOSE: 103 MG/DL — SIGNIFICANT CHANGE UP (ref 68–114)
GLUCOSE SERPL-MCNC: 100 MG/DL — HIGH (ref 70–99)
HCT VFR BLD CALC: 42.5 % — SIGNIFICANT CHANGE UP (ref 34.5–45)
HCT VFR BLD CALC: 43.1 % — SIGNIFICANT CHANGE UP (ref 34.5–45)
HCV AB S/CO SERPL IA: 1.02 S/CO — HIGH (ref 0–0.99)
HCV AB SERPL-IMP: ABNORMAL
HDLC SERPL-MCNC: 68 MG/DL — SIGNIFICANT CHANGE UP
HGB BLD-MCNC: 14.3 G/DL — SIGNIFICANT CHANGE UP (ref 11.5–15.5)
HGB BLD-MCNC: 14.4 G/DL — SIGNIFICANT CHANGE UP (ref 11.5–15.5)
IMM GRANULOCYTES NFR BLD AUTO: 0.7 % — SIGNIFICANT CHANGE UP (ref 0–0.9)
LIPID PNL WITH DIRECT LDL SERPL: 120 MG/DL — HIGH
LYMPHOCYTES # BLD AUTO: 0.49 K/UL — LOW (ref 1–3.3)
LYMPHOCYTES # BLD AUTO: 5 % — LOW (ref 13–44)
MCHC RBC-ENTMCNC: 30.9 PG — SIGNIFICANT CHANGE UP (ref 27–34)
MCHC RBC-ENTMCNC: 31.5 PG — SIGNIFICANT CHANGE UP (ref 27–34)
MCHC RBC-ENTMCNC: 33.2 GM/DL — SIGNIFICANT CHANGE UP (ref 32–36)
MCHC RBC-ENTMCNC: 33.9 GM/DL — SIGNIFICANT CHANGE UP (ref 32–36)
MCV RBC AUTO: 93 FL — SIGNIFICANT CHANGE UP (ref 80–100)
MCV RBC AUTO: 93.1 FL — SIGNIFICANT CHANGE UP (ref 80–100)
MONOCYTES # BLD AUTO: 0.49 K/UL — SIGNIFICANT CHANGE UP (ref 0–0.9)
MONOCYTES NFR BLD AUTO: 5 % — SIGNIFICANT CHANGE UP (ref 2–14)
NEUTROPHILS # BLD AUTO: 8.82 K/UL — HIGH (ref 1.8–7.4)
NEUTROPHILS NFR BLD AUTO: 89.2 % — HIGH (ref 43–77)
NON HDL CHOLESTEROL: 132 MG/DL — HIGH
NRBC # BLD: 0 /100 WBCS — SIGNIFICANT CHANGE UP (ref 0–0)
NRBC # BLD: 0 /100 WBCS — SIGNIFICANT CHANGE UP (ref 0–0)
PLATELET # BLD AUTO: 172 K/UL — SIGNIFICANT CHANGE UP (ref 150–400)
PLATELET # BLD AUTO: 179 K/UL — SIGNIFICANT CHANGE UP (ref 150–400)
POTASSIUM SERPL-MCNC: 4.4 MMOL/L — SIGNIFICANT CHANGE UP (ref 3.5–5.3)
POTASSIUM SERPL-SCNC: 4.4 MMOL/L — SIGNIFICANT CHANGE UP (ref 3.5–5.3)
PROT SERPL-MCNC: 6.4 G/DL — SIGNIFICANT CHANGE UP (ref 6–8.3)
RBC # BLD: 4.57 M/UL — SIGNIFICANT CHANGE UP (ref 3.8–5.2)
RBC # BLD: 4.63 M/UL — SIGNIFICANT CHANGE UP (ref 3.8–5.2)
RBC # FLD: 13.4 % — SIGNIFICANT CHANGE UP (ref 10.3–14.5)
RBC # FLD: 13.4 % — SIGNIFICANT CHANGE UP (ref 10.3–14.5)
SODIUM SERPL-SCNC: 138 MMOL/L — SIGNIFICANT CHANGE UP (ref 135–145)
TRIGL SERPL-MCNC: 64 MG/DL — SIGNIFICANT CHANGE UP
TROPONIN T, HIGH SENSITIVITY RESULT: 420 NG/L — HIGH (ref 0–51)
TROPONIN T, HIGH SENSITIVITY RESULT: 485 NG/L — HIGH (ref 0–51)
WBC # BLD: 9.88 K/UL — SIGNIFICANT CHANGE UP (ref 3.8–10.5)
WBC # BLD: 9.92 K/UL — SIGNIFICANT CHANGE UP (ref 3.8–10.5)
WBC # FLD AUTO: 9.88 K/UL — SIGNIFICANT CHANGE UP (ref 3.8–10.5)
WBC # FLD AUTO: 9.92 K/UL — SIGNIFICANT CHANGE UP (ref 3.8–10.5)

## 2023-03-23 PROCEDURE — 71045 X-RAY EXAM CHEST 1 VIEW: CPT

## 2023-03-23 PROCEDURE — 82553 CREATINE MB FRACTION: CPT

## 2023-03-23 PROCEDURE — 87521 HEPATITIS C PROBE&RVRS TRNSC: CPT

## 2023-03-23 PROCEDURE — 84295 ASSAY OF SERUM SODIUM: CPT

## 2023-03-23 PROCEDURE — 94640 AIRWAY INHALATION TREATMENT: CPT

## 2023-03-23 PROCEDURE — 96365 THER/PROPH/DIAG IV INF INIT: CPT

## 2023-03-23 PROCEDURE — 83036 HEMOGLOBIN GLYCOSYLATED A1C: CPT

## 2023-03-23 PROCEDURE — 83880 ASSAY OF NATRIURETIC PEPTIDE: CPT

## 2023-03-23 PROCEDURE — 70450 CT HEAD/BRAIN W/O DYE: CPT | Mod: MA

## 2023-03-23 PROCEDURE — 85027 COMPLETE CBC AUTOMATED: CPT

## 2023-03-23 PROCEDURE — 82330 ASSAY OF CALCIUM: CPT

## 2023-03-23 PROCEDURE — 80061 LIPID PANEL: CPT

## 2023-03-23 PROCEDURE — 85014 HEMATOCRIT: CPT

## 2023-03-23 PROCEDURE — 85018 HEMOGLOBIN: CPT

## 2023-03-23 PROCEDURE — 82435 ASSAY OF BLOOD CHLORIDE: CPT

## 2023-03-23 PROCEDURE — 71275 CT ANGIOGRAPHY CHEST: CPT | Mod: MA

## 2023-03-23 PROCEDURE — 36415 COLL VENOUS BLD VENIPUNCTURE: CPT

## 2023-03-23 PROCEDURE — 82565 ASSAY OF CREATININE: CPT

## 2023-03-23 PROCEDURE — 93356 MYOCRD STRAIN IMG SPCKL TRCK: CPT

## 2023-03-23 PROCEDURE — 85730 THROMBOPLASTIN TIME PARTIAL: CPT

## 2023-03-23 PROCEDURE — 99223 1ST HOSP IP/OBS HIGH 75: CPT

## 2023-03-23 PROCEDURE — 82550 ASSAY OF CK (CPK): CPT

## 2023-03-23 PROCEDURE — 83605 ASSAY OF LACTIC ACID: CPT

## 2023-03-23 PROCEDURE — 96361 HYDRATE IV INFUSION ADD-ON: CPT

## 2023-03-23 PROCEDURE — 86803 HEPATITIS C AB TEST: CPT

## 2023-03-23 PROCEDURE — 96375 TX/PRO/DX INJ NEW DRUG ADDON: CPT

## 2023-03-23 PROCEDURE — 84132 ASSAY OF SERUM POTASSIUM: CPT

## 2023-03-23 PROCEDURE — 96372 THER/PROPH/DIAG INJ SC/IM: CPT | Mod: XU

## 2023-03-23 PROCEDURE — 85610 PROTHROMBIN TIME: CPT

## 2023-03-23 PROCEDURE — 99285 EMERGENCY DEPT VISIT HI MDM: CPT | Mod: 25

## 2023-03-23 PROCEDURE — 0225U NFCT DS DNA&RNA 21 SARSCOV2: CPT

## 2023-03-23 PROCEDURE — 93306 TTE W/DOPPLER COMPLETE: CPT

## 2023-03-23 PROCEDURE — 82803 BLOOD GASES ANY COMBINATION: CPT

## 2023-03-23 PROCEDURE — 85025 COMPLETE CBC W/AUTO DIFF WBC: CPT

## 2023-03-23 PROCEDURE — 80053 COMPREHEN METABOLIC PANEL: CPT

## 2023-03-23 PROCEDURE — 93306 TTE W/DOPPLER COMPLETE: CPT | Mod: 26

## 2023-03-23 PROCEDURE — 84484 ASSAY OF TROPONIN QUANT: CPT

## 2023-03-23 PROCEDURE — 82947 ASSAY GLUCOSE BLOOD QUANT: CPT

## 2023-03-23 RX ORDER — ASPIRIN/CALCIUM CARB/MAGNESIUM 324 MG
1 TABLET ORAL
Qty: 0 | Refills: 0 | DISCHARGE
Start: 2023-03-23

## 2023-03-23 RX ORDER — ARFORMOTEROL TARTRATE 15 UG/2ML
2 SOLUTION RESPIRATORY (INHALATION)
Qty: 0 | Refills: 0 | DISCHARGE

## 2023-03-23 RX ORDER — TIOTROPIUM BROMIDE 18 UG/1
2 CAPSULE ORAL; RESPIRATORY (INHALATION) DAILY
Refills: 0 | Status: DISCONTINUED | OUTPATIENT
Start: 2023-03-23 | End: 2023-03-23

## 2023-03-23 RX ORDER — BUDESONIDE, MICRONIZED 100 %
2 POWDER (GRAM) MISCELLANEOUS
Qty: 0 | Refills: 0 | DISCHARGE

## 2023-03-23 RX ORDER — MONTELUKAST 4 MG/1
1 TABLET, CHEWABLE ORAL
Qty: 0 | Refills: 0 | DISCHARGE

## 2023-03-23 RX ORDER — ACETAMINOPHEN 500 MG
2 TABLET ORAL
Qty: 0 | Refills: 0 | DISCHARGE
Start: 2023-03-23

## 2023-03-23 RX ORDER — TIOTROPIUM BROMIDE 18 UG/1
2 CAPSULE ORAL; RESPIRATORY (INHALATION)
Qty: 0 | Refills: 0 | DISCHARGE
Start: 2023-03-23

## 2023-03-23 RX ORDER — GABAPENTIN 400 MG/1
1 CAPSULE ORAL
Qty: 0 | Refills: 0 | DISCHARGE

## 2023-03-23 RX ORDER — IPRATROPIUM/ALBUTEROL SULFATE 18-103MCG
3 AEROSOL WITH ADAPTER (GRAM) INHALATION
Qty: 0 | Refills: 0 | DISCHARGE
Start: 2023-03-23

## 2023-03-23 RX ORDER — FEXOFENADINE HCL 30 MG
0 TABLET ORAL
Qty: 0 | Refills: 0 | DISCHARGE

## 2023-03-23 RX ORDER — GABAPENTIN 400 MG/1
1 CAPSULE ORAL
Qty: 0 | Refills: 0 | DISCHARGE
Start: 2023-03-23

## 2023-03-23 RX ORDER — ALBUTEROL 90 UG/1
2 AEROSOL, METERED ORAL
Qty: 0 | Refills: 0 | DISCHARGE

## 2023-03-23 RX ORDER — LORATADINE 10 MG/1
1 TABLET ORAL
Qty: 0 | Refills: 0 | DISCHARGE
Start: 2023-03-23

## 2023-03-23 RX ORDER — ATORVASTATIN CALCIUM 80 MG/1
1 TABLET, FILM COATED ORAL
Qty: 0 | Refills: 0 | DISCHARGE
Start: 2023-03-23

## 2023-03-23 RX ORDER — ESCITALOPRAM OXALATE 10 MG/1
1 TABLET, FILM COATED ORAL
Qty: 0 | Refills: 0 | DISCHARGE

## 2023-03-23 RX ADMIN — Medication 3 MILLILITER(S): at 12:30

## 2023-03-23 RX ADMIN — Medication 0.5 MILLIGRAM(S): at 00:00

## 2023-03-23 RX ADMIN — Medication 3 MILLILITER(S): at 00:00

## 2023-03-23 RX ADMIN — Medication 81 MILLIGRAM(S): at 12:27

## 2023-03-23 RX ADMIN — LORATADINE 10 MILLIGRAM(S): 10 TABLET ORAL at 12:27

## 2023-03-23 RX ADMIN — Medication 3 MILLILITER(S): at 05:53

## 2023-03-23 RX ADMIN — HEPARIN SODIUM 3800 UNIT(S): 5000 INJECTION INTRAVENOUS; SUBCUTANEOUS at 00:37

## 2023-03-23 RX ADMIN — HEPARIN SODIUM 750 UNIT(S)/HR: 5000 INJECTION INTRAVENOUS; SUBCUTANEOUS at 00:47

## 2023-03-23 RX ADMIN — Medication 0.5 MILLIGRAM(S): at 05:53

## 2023-03-23 RX ADMIN — ESCITALOPRAM OXALATE 10 MILLIGRAM(S): 10 TABLET, FILM COATED ORAL at 12:28

## 2023-03-23 RX ADMIN — TIOTROPIUM BROMIDE 2 PUFF(S): 18 CAPSULE ORAL; RESPIRATORY (INHALATION) at 12:30

## 2023-03-23 RX ADMIN — HEPARIN SODIUM 850 UNIT(S)/HR: 5000 INJECTION INTRAVENOUS; SUBCUTANEOUS at 08:48

## 2023-03-23 RX ADMIN — HEPARIN SODIUM 750 UNIT(S)/HR: 5000 INJECTION INTRAVENOUS; SUBCUTANEOUS at 07:28

## 2023-03-23 RX ADMIN — MONTELUKAST 10 MILLIGRAM(S): 4 TABLET, CHEWABLE ORAL at 12:27

## 2023-03-23 RX ADMIN — Medication 40 MILLIGRAM(S): at 05:53

## 2023-03-23 NOTE — DISCHARGE NOTE PROVIDER - NSDCCPCAREPLAN_GEN_ALL_CORE_FT
PRINCIPAL DISCHARGE DIAGNOSIS  Diagnosis: COPD with respiratory distress, acute  Assessment and Plan of Treatment: does not meet gold criteria for antibiotics   - duoneb [id number]   - continue Solumedrol   - Budesonide   - tiotropium   - Singulair   - pulmonary consulted Dr Atkinson   Avoid environmental allergens and asthma triggers.  Participate in physical activity as tolerated.  Seek immediate medical attention if the shortness of breath does not improve with asthma treatments, or if you experience chest pain or palpitations.  Call an ambulance if your lips or nail beds become a bluish color.        SECONDARY DISCHARGE DIAGNOSES  Diagnosis: NSTEMI (non-ST elevation myocardial infarction)  Assessment and Plan of Treatment: Troponin (388 > 466 > 564 > 485 -> 420), EKG with no acute s-t changes, s/p ASA load , initially started on heparin for ACS protocol; discussed with cardiology, recent cath with clean coronaries makes CAD less likely , mores likely type II NStemi in setting of COPD exacerbation - heparin gtt  stopped  - Monitor on telemetry    - echocardiogram   - follow up cardiology    Diagnosis: Anxiety  Assessment and Plan of Treatment: continue with Lexapro as prescribed    Diagnosis: Neuropathy  Assessment and Plan of Treatment: continue with gabapentin as prescribed    Diagnosis: Renal lesion  Assessment and Plan of Treatment: - 8mm indeterminate exophytic nodule arising from the partially imaged left kidney. And Lung nodule  - Findings on CT disclosed to daughter , recommend 3 month follow up.

## 2023-03-23 NOTE — DISCHARGE NOTE NURSING/CASE MANAGEMENT/SOCIAL WORK - PATIENT PORTAL LINK FT
You can access the FollowMyHealth Patient Portal offered by [facility] by registering at the following website: [email or web address]. By joining [employer]’s FollowMyHealth portal, you will also be able to view your health information using other applications (apps) compatible with our system.

## 2023-03-23 NOTE — CONSULT NOTE ADULT - ASSESSMENT
77 year old female w/ pmhx of COPD, allergies/ GERD and RA/OA, renal lesion  present for wheezing and shortness of breath for the past day. (out pt pulm [facility])-  now admit w/copd exacerbation/NSTEMI, noted w/ abnormal CT -GG nodule, PNDrip/allergies, GERD. Unclear exacerbator for copd?  **********************    SEE Below
78 YO F with PMH of COPD, Asthma  presents to [facility] ED for wheezing and and SOB x1 day.  ENT was consulted for evaluation. Per daughter at bedside, pt noted to have cough, wheezing, and SOB this morning. Reports taking Neb and rescue Inhaler but didnt help. Pt denies throat closing sensation, Foreign body sensation,  hoarseness, muffled voice, drooling, able to handle secretions without difficulty. FOE showed, Post cricoid edema, pachydermia 2/2 LPRD.  Vocal cords mobile with good contact b/l. Diffuse dryness in larynx. CT Angio of Chest was negative for PE. + Emphysema. CXR was clear.

## 2023-03-23 NOTE — PATIENT PROFILE ADULT - FALL HARM RISK - HARM RISK INTERVENTIONS

## 2023-03-23 NOTE — DISCHARGE NOTE PROVIDER - NSDCMRMEDTOKEN_GEN_ALL_CORE_FT
acetaminophen 325 mg oral tablet: 2 tab(s) orally every 6 hours, As needed, Temp greater or equal to 38C (100.4F), Mild Pain (1 - 3)  aluminum hydroxide-magnesium hydroxide 200 mg-200 mg/5 mL oral suspension: 30 milliliter(s) orally every 4 hours, As needed, Dyspepsia  aspirin 81 mg oral delayed release tablet: 1 tab(s) orally once a day  atorvastatin 40 mg oral tablet: 1 tab(s) orally once a day (at bedtime)  budesonide 0.5 mg/2 mL inhalation suspension: 2 milliliter(s) inhaled 2 times a day  gabapentin 300 mg oral capsule: 1 cap(s) orally once a day (at bedtime)  ipratropium-albuterol 0.5 mg-2.5 mg/3 mL inhalation solution: 3 milliliter(s) inhaled every 6 hours  Lexapro 10 mg oral tablet: 1 tab(s) orally once a day  loratadine 10 mg oral tablet: 1 tab(s) orally once a day  montelukast 10 mg oral tablet: 1 tab(s) orally once a day  tiotropium 2.5 mcg/inh inhalation aerosol: 2 puff(s) inhaled once a day   acetaminophen 325 mg oral tablet: 2 tab(s) orally every 6 hours, As needed, Temp greater or equal to 38C (100.4F), Mild Pain (1 - 3)  aluminum hydroxide-magnesium hydroxide 200 mg-200 mg/5 mL oral suspension: 30 milliliter(s) orally every 4 hours, As needed, Dyspepsia  aspirin 81 mg oral delayed release tablet: 1 tab(s) orally once a day  atorvastatin 40 mg oral tablet: 1 tab(s) orally once a day (at bedtime)  budesonide 0.5 mg/2 mL inhalation suspension: 2 milliliter(s) inhaled 2 times a day  gabapentin 300 mg oral capsule: 1 cap(s) orally once a day (at bedtime)  ipratropium-albuterol 0.5 mg-2.5 mg/3 mL inhalation solution: 3 milliliter(s) inhaled every 6 hours  Lexapro 10 mg oral tablet: 1 tab(s) orally once a day  loratadine 10 mg oral tablet: 1 tab(s) orally once a day  methylPREDNISolone: 40 milligram(s) intravenous once a day   montelukast 10 mg oral tablet: 1 tab(s) orally once a day  tiotropium 2.5 mcg/inh inhalation aerosol: 2 puff(s) inhaled once a day

## 2023-03-23 NOTE — DISCHARGE NOTE NURSING/CASE MANAGEMENT/SOCIAL WORK - NSDCPEFALRISK_GEN_ALL_CORE
For information on Fall & Injury Prevention, visit: [email or web address] OR  [email or web address] OR  https://www.cdc.gov/steadi/patient.html

## 2023-03-23 NOTE — PROVIDER CONTACT NOTE (OTHER) - SITUATION
Staff received phone call from Tj from [facility] to facilitate the transfer of pt to [facility]. Staff and nurse manager were not informed of such transfer.

## 2023-03-23 NOTE — DISCHARGE NOTE PROVIDER - HOSPITAL COURSE
77 year old female with PMHx COPD, asthma presented to ED for acute onset SOB with wheezing   Problem/Plan - 1:  ·  Problem: NSTEMI (non-ST elevation myocardial infarction).   ·  Plan: up trending cardiac enzymes , ekg w/ no acute s-t changes , patient c/o active chest pain s/p ASA load , will start heparin for ACS protocol; discussed with cardiology, recent cath w/ clean coronaries makes CAD less likely , mores likely type II NStemi in setting of COPD exacerbation   - Telemetry   - trend troponin until peaks   - Heparin infusion ACS protocol   - continue asa   - a1c/ lipid profile   - atorvastatin  - echocardiogram   - follow up cardiology consult.     Problem/Plan - 2:  ·  Problem: COPD exacerbation.   ·  Plan: does not meet gold criteria for antibiotics   - duoneb q6h   - continue Solumedrol   - Budesonide   - tiotropium   - Singulair   - pulmonary consult - to be arranged by day team  - continue oxygen as needed.     Problem/Plan - 3:  ·  Problem: Anxiety.   ·  Plan: - Lexapro.     Problem/Plan - 4:  ·  Problem: Neuropathy.   ·  Plan: -gabapentin.     Problem/Plan - 5:  ·  Problem: Renal lesion.   ·  Plan: 8mm indeterminate exophytic nodule arising from the partially imaged left   kidney.  findings on CT disclosed to daughter , recommend 3 month follow up.    77 year old female with PMHx COPD, asthma presented to ED for acute onset SOB with wheezing  Concern for NSTEMI (non-ST elevation myocardial infarction).   trops (388 > 466 > 564 > 485 -> 420), ekg w/ no acute s-t changes , patient c/o active chest pain s/p ASA load , initially started on heparin for ACS protocol; discussed with cardiology, recent cath w/ clean coronaries makes CAD less likely , mores likely type II NStemi in setting of COPD exacerbation - heparin gtt d/c'ed.   - Monitored on telemetry    - continue asa   - a1c/ lipid profile   - atorvastatin  - echocardiogram   - follow up cardiology     · COPD exacerbation.   - does not meet gold criteria for antibiotics   - duoneb q6h   - continue Solumedrol   - Budesonide   - tiotropium   - Singulair   - pulmonary consult   - continue oxygen as needed.     · Anxiety.   - Lexapro.    Neuropathy.   - gabapentin.    · Renal lesion.   - 8mm indeterminate exophytic nodule arising from the partially imaged left kidney.  - findings on CT disclosed to daughter , recommend 3 month follow up.    As per attendinf Dr. Ward, patient is stable for transfer to [facility] for continued care.        77 year old female with PMHx COPD, asthma presented to ED for acute onset SOB with wheezing  Concern for NSTEMI (non-ST elevation myocardial infarction).   trops (388 > 466 > 564 > 485 -> 420), ekg w/ no acute s-t changes , patient c/o active chest pain s/p ASA load , initially started on heparin for ACS protocol; discussed with cardiology, recent cath w/ clean coronaries makes CAD less likely , mores likely type II NStemi in setting of COPD exacerbation - heparin gtt d/c'ed.   - Monitored on telemetry    - continue asa   - a1c/ lipid profile   - atorvastatin  - Echocardiogram Conclusions:  1. Normal mitral valve. Minimal mitral regurgitation.  2. Aortic valve not well visualized; probably normal. No  aortic valve regurgitation seen.  3. Mild segmental left ventricular systolic dysfunction.  The mid septum, mid inferior, and mid inferolateral  segments are akinetic. There is a false tendon in the LV  cavity (normal variant).  4. Global longtudinal strain (GLS) measurements performed  on Tomtec with HR of 95 bpm and BP of 102/64 mmHg. Average  GLS= -17.6% (mildly decreased).  5. Normal right ventricular size and function.  - follow up cardiology     · COPD exacerbation.   - does not meet gold criteria for antibiotics   - duoneb q6h   - continue Solumedrol   - Budesonide   - tiotropium   - Singulair   - pulmonary consult   - continue oxygen as needed.     · Anxiety.   - Lexapro.    Neuropathy.   - gabapentin.    · Renal lesion.   - 8mm indeterminate exophytic nodule arising from the partially imaged left kidney.  - findings on CT disclosed to daughter , recommend 3 month follow up.    As per attendinf Dr. Ward, patient is stable for transfer to [facility] for continued care.

## 2023-03-23 NOTE — DISCHARGE NOTE PROVIDER - CARE PROVIDER_API CALL
Calixto Tucker  CRITICAL CARE MEDICINE  [address] [place] [address]  Phone: [phone]  Fax: [phone]  Follow Up Time: 1 week

## 2023-03-23 NOTE — CONSULT NOTE ADULT - TIME BILLING
as above:  multifactorial resp failure-copd exacerbation of ? etiology, CAD-NSTEMI, debility--O2 NC sat above 90%  copd exacerbation-solumedrol at 40mg q day at present (hope to continue at this dose), brovana/budes. 5 bid neb rx, spiriva, incentive spi/acapella  allergy-singulair, flonase 1 bid  CAD-NSTEMI-on heparin, w/up as per Ed  abnormal CT--GG nodule of concern 1.3 cm--will need f/u 3-4 months and comparison to old ct of 2 yrs ago, renal lesion will need f/up as well  GERD-protonix 40 am, pepcid 40mg qhs             ? [facility]-out pt SS  PT, OOB as able  DC planning when stable          out pt f/up Garrett Atkinson MD-Pulmonary   [phone]

## 2023-03-23 NOTE — CONSULT NOTE ADULT - SUBJECTIVE AND OBJECTIVE BOX
HPI: Patient is a 77 year old female w/ pmhx of COPD, allergies/ GERD and RA/OA, renal lesion  present for wheezing and shortness of breath for the past day. (out pt pulginette Tucker)  Patient reports wheezing and shortness of breath with non productive cough , started on morning of admission , no improvement with albuterol or nebulizers treatment. She also reports right sided chest pain at the time , not worse with exertion or deep breath , but exacerbated with coughing , No fever or chills , no  sick contacts , no recent travel.  Pt denies throat closing sensation, Foreign body sensation,  hoarseness, muffled voice, drooling, able to handle secretions without difficulty. Also reports  a fall from bed sustaining trauma to her knees , no head trauma no reported LOC  last night. Also denies fever/chills, runny nose, sinus pain, sore throat, N/V, CP, HA, Dysphagia/Odynophagia, recent travel/sick contacts.     ENT evaln-FOE: Post cricoid edema, pachydermia 2/2 LPRD.  Vocal cords mobile with good contact b/l. Diffuse dryness in larynx.  - Humidified O2 via Face tent.   - NS 0.65% 2gtts TID to b/l nares.    PPI.   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-  Exposures: neg  Recent Travel: neg    Allergies    penicillin (Unknown)    Intolerances        HOME MEDICATIONS: Home Medications:  Albuterol (Eqv-ProAir HFA) 90 mcg/inh inhalation aerosol: 2 puff(s) inhaled every 6 hours, As Needed (22 Mar 2023 21:42)  Allegra:  (22 Mar 2023 21:42)  Brovana 15 mcg/2 mL inhalation solution: 2 milliliter(s) inhaled 2 times a day (22 Mar 2023 21:42)  budesonide 0.5 mg/2 mL inhalation suspension: 2 milliliter(s) inhaled 2 times a day (22 Mar 2023 21:42)  gabapentin 300 mg oral capsule: 1 cap(s) orally once a day (at bedtime) (22 Mar 2023 21:42)  Lexapro 10 mg oral tablet: 1 tab(s) orally once a day (22 Mar 2023 21:42)  montelukast 10 mg oral tablet: 1 tab(s) orally once a day (22 Mar 2023 21:42)      REVIEW OF SYSTEMS: + snore  Constitutional: No fevers or chills. + weight loss. No fatigue or generalized malaise.  Eyes: No itching or discharge from the eyes  ENT: No ear pain. No ear discharge. No nasal congestion. +post nasal drip. No epistaxis. No throat pain. No sore throat. No difficulty swallowing.   CV: No chest pain. No palpitations. No lightheadedness or dizziness.   Resp: No dyspnea at rest. + dyspnea on exertion. No orthopnea. + wheezing. + cough. No stridor. No sputum production. No chest pain with respiration.  GI: No nausea. No vomiting. No diarrhea. + HB  MSK: No joint pain or pain in any extremities  Integumentary: No skin lesions. No pedal edema.  Neurological: + gross motor weakness. No sensory changes.    [+ ] All other systems negative  [ ] Unable to assess ROS because ________    OBJECTIVE:  ICU Vital Signs Last 24 Hrs  T(C): 36.9 (23 Mar 2023 04:37), Max: 37 (22 Mar 2023 21:40)  T(F): 98.5 (23 Mar 2023 04:37), Max: 98.6 (22 Mar 2023 21:40)  HR: 88 (23 Mar 2023 04:37) (88 - 120)  BP: 102/64 (23 Mar 2023 04:37) (94/66 - 114/55)  BP(mean): 71 (22 Mar 2023 18:44) (65 - 71)  ABP: --  ABP(mean): --  RR: 18 (23 Mar 2023 04:37) (18 - 22)  SpO2: 95% (23 Mar 2023 04:37) (95% - 99%)    O2 Parameters below as of 23 Mar 2023 04:37  Patient On (Oxygen Delivery Method): nasal cannula  O2 Flow (L/min): 3            CAPILLARY BLOOD GLUCOSE          PHYSICAL EXAM: NAD in bed on O2/neb rx  General: Awake, alert, oriented X 3.   HEENT: Atraumatic, normocephalic.                 Mallampatti Grade 2                No nasal congestion.                No tonsillar or pharyngeal exudates.  Lymph Nodes: No palpable lymphadenopathy  Neck: No JVD. No carotid bruit.   Respiratory: Normal chest expansion                         Normal percussion                         Normal and equal air entry                         + wheeze, no rhonchi or rales.  Cardiovascular: S1 S2 normal. No murmurs, rubs or gallops.   Abdomen: Soft, non-tender, non-distended. No organomegaly. Normoactive bowel sounds.  Extremities: Warm to touch. Peripheral pulse palpable. No pedal edema.   Skin: No rashes or skin lesions  Neurological: Motor and sensory examination equal and normal in all four extremities.  Psychiatry: Appropriate mood and affect.    HOSPITAL MEDICATIONS:  MEDICATIONS  (STANDING):  albuterol/ipratropium for Nebulization 3 milliLiter(s) Nebulizer every 6 hours  aspirin enteric coated 81 milliGRAM(s) Oral daily  atorvastatin 40 milliGRAM(s) Oral at bedtime  buDESOnide    Inhalation Suspension 0.5 milliGRAM(s) Inhalation every 12 hours  escitalopram 10 milliGRAM(s) Oral daily  gabapentin 300 milliGRAM(s) Oral at bedtime  heparin  Infusion.  Unit(s)/Hr (7.5 mL/Hr) IV Continuous <Continuous>  loratadine 10 milliGRAM(s) Oral daily  methylPREDNISolone sodium succinate Injectable 40 milliGRAM(s) IV Push daily  montelukast 10 milliGRAM(s) Oral daily  tiotropium 2.5 MICROgram(s) Inhaler 2 Puff(s) Inhalation daily    MEDICATIONS  (PRN):  acetaminophen     Tablet .. 650 milliGRAM(s) Oral every 6 hours PRN Temp greater or equal to 38C (100.4F), Mild Pain (1 - 3)  aluminum hydroxide/magnesium hydroxide/simethicone Suspension 30 milliLiter(s) Oral every 4 hours PRN Dyspepsia  heparin   Injectable 3800 Unit(s) IV Push every 6 hours PRN For aPTT less than 40  melatonin 3 milliGRAM(s) Oral at bedtime PRN Insomnia  ondansetron Injectable 4 milliGRAM(s) IV Push every 8 hours PRN Nausea and/or Vomiting      LABS:                        15.1   9.82  )-----------( 168      ( 22 Mar 2023 11:05 )             47.0     03-22    141  |  106  |  15  ----------------------------<  99  4.7   |  22  |  0.59    Ca    9.0      22 Mar 2023 11:05    TPro  6.6  /  Alb  4.4  /  TBili  0.8  /  DBili  x   /  AST  23  /  ALT  18  /  AlkPhos  73  03-22    PT/INR - ( 22 Mar 2023 15:26 )   PT: 11.8 sec;   INR: 1.03 ratio         PTT - ( 22 Mar 2023 15:26 )  PTT:27.6 sec      Venous Blood Gas:  03-22 @ 15:18  7.41/35/72/22/97.2  VBG Lactate: --  Venous Blood Gas:  03-22 @ 10:40  7.31/53/42/27/73.2  VBG Lactate: 1.6      MICROBIOLOGY:     RADIOLOGY: < from: CT Angio Chest PE Protocol w/ IV Cont (03.22.23 @ 19:09) >    LUNGS AND AIRWAYS: Patent central airways.  A 5 mm solid pulmonary nodule   in the left lower lobe. Trace bibasilar subsegmental atelectasis. 1.3 cm   ill-defined groundglass nodular opacity within the right upper lobe on   image 47 of series 5. Emphysema.  PLEURA: No pleural effusion.  MEDIASTINUM AND SARAH: Multiple nonspecific mediastinal and right hilar   lymph nodes measuring up to about 9 mm in short axis.  VESSELS: No pulmonary embolism. Arterial intimal calcified and   noncalcified plaques with mild to moderate involvement of the descending   thoracic aorta.  HEART: Heart size is normal. No pericardial effusion. Aortic valvular and   coronary artery calcifications.  CHEST WALL AND LOWER NECK: Within normal limits.  VISUALIZED UPPER ABDOMEN: Cysts within the partially imaged left kidney.   Multiple other bilateral subcentimeter hypodensities within the partially   imaged kidneys. Slightly dense and indeterminate exophytic 8 mm nodule   arising from the left kidney on image 161 of series 5.  BONES: Degenerative changes.    IMPRESSION:  No pulmonary embolism.    1.3 cm ill-defined groundglass nodular opacity within the right upper   lobe is nonspecific. Differential diagnostic considerations include small   focus of infection/inflammation, focus of scarring or primary lung   neoplasm. 3 month follow-up noncontrast chest CT is recommended for   further evaluation.    Emphysema.    8mm indeterminate exophytic nodule arising from the partially imaged left   kidney. A 3 month follow-up contrast-enhanced liver MRI can be performed   for further evaluation.      < end of copied text >    [ ] Reviewed and interpreted by me    Point of Care Ultrasound Findings;    PFT:    EKG:

## 2023-03-23 NOTE — CHART NOTE - NSCHARTNOTEFT_GEN_A_CORE
Informed by attending Dr. Ward, patient being transferred to [facility] for continued care. Medication reconciliation reviewed, revised, and resolved with Dr. Ward - recommending to discontinue heparin gtt. Please refer to discharge note for detailed hospital course.      ROSALVA Albrecht PA-C  Dept of Medicine   Contact #[phone]

## 2023-03-28 LAB
HCV RNA FLD QL NAA+PROBE: SIGNIFICANT CHANGE UP
HCV RNA SPEC QL PROBE+SIG AMP: SIGNIFICANT CHANGE UP

## 2025-07-29 NOTE — PATIENT PROFILE ADULT - FUNCTIONAL ASSESSMENT - BASIC MOBILITY 6.
Pt would like letter faxed to [facility] @ [phone] [FreeTextEntry1] : Left knee pain for a month with no injury.  PF OA present.  MRI Left knee (OC) 4/21/25 - severe pf oa, mmt, effusion, bakers cyst.   CSI on 5/6/25 with short-term relief.  Was improving with PT but increased pain recently after playing tennis. Mostly medial pain. Possible meniscus tear progression, possible osteoarthritis exacerbation, possible occult injury. This is likely an exacerbation of a chronic condition or a new condition with an unknown diagnosis and long term prognosis.  *The patient has a history of the following orthopaedic issues which are stable and not being managed on this visit.  - right knee bump which she noticed in july 2022.   mri 2023 shows mmt, oa.  pain but improves with pt.  - left ankle pain for months.  no injury.  ttp achilles tendon.  achilles intact on exam.  likely tendonitis/ strain. only hurts after she plays tennnis.  dicussed risk of rupture with tennis and not to play through pain.   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    3-calculated by average/Not able to assess (calculate score using [facility] averaging method)